# Patient Record
Sex: FEMALE | Race: WHITE | NOT HISPANIC OR LATINO | URBAN - METROPOLITAN AREA
[De-identification: names, ages, dates, MRNs, and addresses within clinical notes are randomized per-mention and may not be internally consistent; named-entity substitution may affect disease eponyms.]

---

## 2017-04-03 ENCOUNTER — IMPORTED ENCOUNTER (OUTPATIENT)
Dept: URBAN - METROPOLITAN AREA CLINIC 31 | Facility: CLINIC | Age: 57
End: 2017-04-03

## 2017-04-03 PROBLEM — H00.025: Noted: 2017-04-03

## 2017-04-03 PROCEDURE — 92012 INTRM OPH EXAM EST PATIENT: CPT

## 2017-04-17 ENCOUNTER — IMPORTED ENCOUNTER (OUTPATIENT)
Dept: URBAN - METROPOLITAN AREA CLINIC 31 | Facility: CLINIC | Age: 57
End: 2017-04-17

## 2017-04-17 PROCEDURE — V2520 CONTACT LENS HYDROPHILIC: HCPCS

## 2017-04-17 PROCEDURE — 92015 DETERMINE REFRACTIVE STATE: CPT

## 2017-04-17 PROCEDURE — 92310 CONTACT LENS FITTING OU: CPT

## 2017-04-17 PROCEDURE — 92014 COMPRE OPH EXAM EST PT 1/>: CPT

## 2017-04-17 NOTE — PATIENT DISCUSSION
Glasses change optional. Continue present contact lens modality. Stop/wear and call if any redness pain or decrease in vision occur.

## 2018-06-07 ENCOUNTER — IMPORTED ENCOUNTER (OUTPATIENT)
Dept: URBAN - METROPOLITAN AREA CLINIC 31 | Facility: CLINIC | Age: 58
End: 2018-06-07

## 2018-06-07 PROCEDURE — 92015 DETERMINE REFRACTIVE STATE: CPT

## 2018-06-07 PROCEDURE — 92014 COMPRE OPH EXAM EST PT 1/>: CPT

## 2018-06-07 PROCEDURE — 92310 CONTACT LENS FITTING OU: CPT

## 2018-06-20 ENCOUNTER — IMPORTED ENCOUNTER (OUTPATIENT)
Dept: URBAN - METROPOLITAN AREA CLINIC 31 | Facility: CLINIC | Age: 58
End: 2018-06-20

## 2018-06-20 PROCEDURE — 92015 DETERMINE REFRACTIVE STATE: CPT

## 2018-07-10 ENCOUNTER — IMPORTED ENCOUNTER (OUTPATIENT)
Dept: URBAN - METROPOLITAN AREA CLINIC 31 | Facility: CLINIC | Age: 58
End: 2018-07-10

## 2018-07-10 NOTE — PATIENT DISCUSSION
Variable refractions OD/OS. Stop contact lens wear. Discussed in detail Blood sugar changes vs Corneal changes due to CL wear. Advised pt to stop cls for next two weeks and we will rerefract to see if there is still changes in the Rx.

## 2018-07-24 ENCOUNTER — IMPORTED ENCOUNTER (OUTPATIENT)
Dept: URBAN - METROPOLITAN AREA CLINIC 31 | Facility: CLINIC | Age: 58
End: 2018-07-24

## 2019-05-01 NOTE — PATIENT DISCUSSION
Diagnosis discussed with patient as benign lesion.  Option removal today.  Patient desires to proceed with excision today.   Begin Roney chris TID to RUL x 2 weeks or until healed.  Follow up prn.

## 2019-05-01 NOTE — PROCEDURE NOTE: CLINICAL
PROCEDURE NOTE: Excision of Eyelid Lesion Right Upper Lid. Diagnosis: Xanthelasma of Eyelid. Prior to treatment, the risks/benefits/alternatives were discussed. The patient wished to proceed with procedure. Local anesthetic was given. The eyelid was prepped and draped for procedure. The lesion was incised and removed. The wound was repaired with sutures. Patient tolerated procedure well. There were no complications. Post procedure instructions given. Christofer Prince

## 2020-11-19 ENCOUNTER — IMPORTED ENCOUNTER (OUTPATIENT)
Dept: URBAN - METROPOLITAN AREA CLINIC 31 | Facility: CLINIC | Age: 60
End: 2020-11-19

## 2020-11-19 PROCEDURE — V2520 CONTACT LENS HYDROPHILIC: HCPCS

## 2020-11-19 PROCEDURE — 92014 COMPRE OPH EXAM EST PT 1/>: CPT

## 2020-11-19 PROCEDURE — 92310 CONTACT LENS FITTING OU: CPT

## 2022-04-02 ASSESSMENT — VISUAL ACUITY
OD_SC: 20/25-2
OS_SC: 20/20-1
OS_SC: 20/20
OS_SC: 20/20
OD_SC: 20/30+3
OD_SC: 20/40+3
OD_PH: CC 20/40
OS_SC: 20/20-1
OS_SC: 20/30+2
OU_SC: 20/20-2
OD_SC: 20/25+1
OS_SC: 20/25-2
OD_SC: 20/25+2
OD_SC: 20/40
OD_SC: 20/30-1
OU_SC: 20/20-1
OS_SC: 20/25
OD_SC: 20/50
OS_SC: 20/20

## 2022-04-02 ASSESSMENT — TONOMETRY
OS_IOP_MMHG: 17
OD_IOP_MMHG: 17
OS_IOP_MMHG: 16
OS_IOP_MMHG: 17
OD_IOP_MMHG: 17
OD_IOP_MMHG: 16

## 2022-04-25 NOTE — PATIENT DISCUSSION
No change to ctl rx.  Patient uses a combination of a/v dailies and biotrue dailies.  She's never been enamored with any of her ctls (especially the toric in the OD) even prior to being seen here for the first time in 2018.  Leaving everything as is for now.  Did discussed using lubricants when she's doing extended near work.

## 2022-07-09 ENCOUNTER — TELEPHONE ENCOUNTER (OUTPATIENT)
Dept: URBAN - METROPOLITAN AREA CLINIC 121 | Facility: CLINIC | Age: 62
End: 2022-07-09

## 2022-07-10 ENCOUNTER — TELEPHONE ENCOUNTER (OUTPATIENT)
Dept: URBAN - METROPOLITAN AREA CLINIC 121 | Facility: CLINIC | Age: 62
End: 2022-07-10

## 2022-07-30 ENCOUNTER — TELEPHONE ENCOUNTER (OUTPATIENT)
Age: 62
End: 2022-07-30

## 2022-07-31 ENCOUNTER — TELEPHONE ENCOUNTER (OUTPATIENT)
Age: 62
End: 2022-07-31

## 2022-12-29 ENCOUNTER — PREPPED CHART (OUTPATIENT)
Dept: URBAN - METROPOLITAN AREA CLINIC 31 | Facility: CLINIC | Age: 62
End: 2022-12-29

## 2023-01-03 ENCOUNTER — COMPREHENSIVE EXAM (OUTPATIENT)
Dept: URBAN - METROPOLITAN AREA CLINIC 31 | Facility: CLINIC | Age: 63
End: 2023-01-03

## 2023-01-03 DIAGNOSIS — H43.813: ICD-10-CM

## 2023-01-03 DIAGNOSIS — H52.4: ICD-10-CM

## 2023-01-03 PROCEDURE — 92250 FUNDUS PHOTOGRAPHY W/I&R: CPT

## 2023-01-03 PROCEDURE — 92014 COMPRE OPH EXAM EST PT 1/>: CPT

## 2023-01-03 PROCEDURE — 92310-3 LEVEL 3 CONTACT LENS MANAGEMENT

## 2023-01-03 PROCEDURE — 92015 DETERMINE REFRACTIVE STATE: CPT

## 2023-01-03 ASSESSMENT — VISUAL ACUITY
OD_CC: 20/50-2
OS_CC: 20/25
OD_PH: 20/25

## 2023-01-12 ENCOUNTER — EMERGENCY VISIT (OUTPATIENT)
Dept: URBAN - METROPOLITAN AREA CLINIC 31 | Facility: CLINIC | Age: 63
End: 2023-01-12

## 2023-01-12 DIAGNOSIS — H00.022: ICD-10-CM

## 2023-01-12 DIAGNOSIS — H52.4: ICD-10-CM

## 2023-01-12 PROCEDURE — 99213 OFFICE O/P EST LOW 20 MIN: CPT

## 2023-01-12 ASSESSMENT — VISUAL ACUITY
OD_CC: 20/25-2
OS_CC: 20/25

## 2023-07-27 ENCOUNTER — TELEPHONE ENCOUNTER (OUTPATIENT)
Dept: URBAN - METROPOLITAN AREA CLINIC 7 | Facility: CLINIC | Age: 63
End: 2023-07-27

## 2023-08-02 ENCOUNTER — DASHBOARD ENCOUNTERS (OUTPATIENT)
Age: 63
End: 2023-08-02

## 2023-09-18 ENCOUNTER — OFFICE VISIT (OUTPATIENT)
Dept: URBAN - METROPOLITAN AREA CLINIC 7 | Facility: CLINIC | Age: 63
End: 2023-09-18

## 2023-09-21 ENCOUNTER — TELEPHONE ENCOUNTER (OUTPATIENT)
Dept: URBAN - METROPOLITAN AREA SURGERY CENTER 5 | Facility: SURGERY CENTER | Age: 63
End: 2023-09-21

## 2023-09-25 ENCOUNTER — CLAIMS CREATED FROM THE CLAIM WINDOW (OUTPATIENT)
Dept: URBAN - METROPOLITAN AREA SURGERY CENTER 5 | Facility: SURGERY CENTER | Age: 63
End: 2023-09-25
Payer: COMMERCIAL

## 2023-09-25 ENCOUNTER — CLAIMS CREATED FROM THE CLAIM WINDOW (OUTPATIENT)
Dept: URBAN - METROPOLITAN AREA CLINIC 4 | Facility: CLINIC | Age: 63
End: 2023-09-25
Payer: COMMERCIAL

## 2023-09-25 ENCOUNTER — OFFICE VISIT (OUTPATIENT)
Dept: URBAN - METROPOLITAN AREA SURGERY CENTER 5 | Facility: SURGERY CENTER | Age: 63
End: 2023-09-25

## 2023-09-25 DIAGNOSIS — D12.4 ADENOMA OF DESCENDING COLON: ICD-10-CM

## 2023-09-25 DIAGNOSIS — D12.4 BENIGN NEOPLASM OF DESCENDING COLON: ICD-10-CM

## 2023-09-25 DIAGNOSIS — Z12.11 ENCOUNTER FOR COLORECTAL CANCER SCREENING: ICD-10-CM

## 2023-09-25 DIAGNOSIS — K63.5 POLYP OF DESCENDING COLON, UNSPECIFIED TYPE: ICD-10-CM

## 2023-09-25 DIAGNOSIS — Z12.11 COLON CANCER SCREENING: ICD-10-CM

## 2023-09-25 DIAGNOSIS — Z12.11 ENCOUNTER FOR SCREENING FOR MALIGNANT NEOPLASM OF COLON: ICD-10-CM

## 2023-09-25 PROCEDURE — 88305 TISSUE EXAM BY PATHOLOGIST: CPT | Performed by: PATHOLOGY

## 2023-09-25 PROCEDURE — 00811 ANES LWR INTST NDSC NOS: CPT | Performed by: NURSE ANESTHETIST, CERTIFIED REGISTERED

## 2023-09-25 PROCEDURE — 45385 COLONOSCOPY W/LESION REMOVAL: CPT | Performed by: INTERNAL MEDICINE

## 2023-12-21 ENCOUNTER — TELEPHONE ENCOUNTER (OUTPATIENT)
Dept: URBAN - METROPOLITAN AREA CLINIC 7 | Facility: CLINIC | Age: 63
End: 2023-12-21

## 2024-04-30 ENCOUNTER — EMERGENCY VISIT (OUTPATIENT)
Dept: URBAN - METROPOLITAN AREA CLINIC 31 | Facility: CLINIC | Age: 64
End: 2024-04-30

## 2024-04-30 DIAGNOSIS — H10.45: ICD-10-CM

## 2024-04-30 PROCEDURE — 92012 INTRM OPH EXAM EST PATIENT: CPT

## 2024-04-30 RX ORDER — PREDNISOLONE ACETATE 10 MG/ML: 1 SUSPENSION/ DROPS OPHTHALMIC

## 2024-04-30 ASSESSMENT — VISUAL ACUITY
OS_SC: 20/25
OD_SC: 20/30-2
OU_SC: 20/25

## 2024-04-30 ASSESSMENT — TONOMETRY
OD_IOP_MMHG: 15
OS_IOP_MMHG: 16